# Patient Record
Sex: FEMALE | Race: WHITE | Employment: OTHER | URBAN - METROPOLITAN AREA
[De-identification: names, ages, dates, MRNs, and addresses within clinical notes are randomized per-mention and may not be internally consistent; named-entity substitution may affect disease eponyms.]

---

## 2019-03-25 ENCOUNTER — HOSPITAL ENCOUNTER (OUTPATIENT)
Dept: LAB | Age: 84
Discharge: HOME | End: 2019-03-25
Payer: MEDICARE

## 2019-03-25 ENCOUNTER — OFFICE VISIT (OUTPATIENT)
Dept: SURGERY | Age: 84
End: 2019-03-25

## 2019-03-25 ENCOUNTER — HOSPITAL ENCOUNTER (OUTPATIENT)
Dept: PREADMISSION TESTING | Age: 84
Discharge: HOME | End: 2019-03-25
Payer: MEDICARE

## 2019-03-25 VITALS
SYSTOLIC BLOOD PRESSURE: 178 MMHG | OXYGEN SATURATION: 94 % | HEART RATE: 80 BPM | TEMPERATURE: 97.9 F | HEIGHT: 60 IN | DIASTOLIC BLOOD PRESSURE: 88 MMHG | WEIGHT: 151 LBS | BODY MASS INDEX: 29.64 KG/M2

## 2019-03-25 DIAGNOSIS — K40.90 RIGHT INGUINAL HERNIA: ICD-10-CM

## 2019-03-25 DIAGNOSIS — R10.31 RIGHT GROIN PAIN: ICD-10-CM

## 2019-03-25 DIAGNOSIS — R10.31 RIGHT LOWER QUADRANT ABDOMINAL PAIN: ICD-10-CM

## 2019-03-25 DIAGNOSIS — K40.90 RIGHT INGUINAL HERNIA: Primary | ICD-10-CM

## 2019-03-25 LAB
ANION GAP SERPL CALC-SCNC: 5 MMOL/L (ref 3–18)
BASOPHILS # BLD: 0 K/UL (ref 0–0.1)
BASOPHILS NFR BLD: 0 % (ref 0–2)
BUN SERPL-MCNC: 20 MG/DL (ref 7–18)
BUN/CREAT SERPL: 23 (ref 12–20)
CALCIUM SERPL-MCNC: 8.8 MG/DL (ref 8.5–10.1)
CHLORIDE SERPL-SCNC: 107 MMOL/L (ref 100–108)
CO2 SERPL-SCNC: 30 MMOL/L (ref 21–32)
CREAT SERPL-MCNC: 0.86 MG/DL (ref 0.6–1.3)
DIFFERENTIAL METHOD BLD: ABNORMAL
EOSINOPHIL # BLD: 0.1 K/UL (ref 0–0.4)
EOSINOPHIL NFR BLD: 2 % (ref 0–5)
ERYTHROCYTE [DISTWIDTH] IN BLOOD BY AUTOMATED COUNT: 14.2 % (ref 11.6–14.5)
GLUCOSE SERPL-MCNC: 107 MG/DL (ref 74–99)
HCT VFR BLD AUTO: 43.6 % (ref 35–45)
HGB BLD-MCNC: 14.2 G/DL (ref 12–16)
LYMPHOCYTES # BLD: 1.1 K/UL (ref 0.9–3.6)
LYMPHOCYTES NFR BLD: 16 % (ref 21–52)
MCH RBC QN AUTO: 30.7 PG (ref 24–34)
MCHC RBC AUTO-ENTMCNC: 32.6 G/DL (ref 31–37)
MCV RBC AUTO: 94.4 FL (ref 74–97)
MONOCYTES # BLD: 0.6 K/UL (ref 0.05–1.2)
MONOCYTES NFR BLD: 10 % (ref 3–10)
NEUTS SEG # BLD: 4.8 K/UL (ref 1.8–8)
NEUTS SEG NFR BLD: 72 % (ref 40–73)
PLATELET # BLD AUTO: 250 K/UL (ref 135–420)
PMV BLD AUTO: 9.2 FL (ref 9.2–11.8)
POTASSIUM SERPL-SCNC: 4.3 MMOL/L (ref 3.5–5.5)
RBC # BLD AUTO: 4.62 M/UL (ref 4.2–5.3)
SODIUM SERPL-SCNC: 142 MMOL/L (ref 136–145)
WBC # BLD AUTO: 6.7 K/UL (ref 4.6–13.2)

## 2019-03-25 PROCEDURE — 36415 COLL VENOUS BLD VENIPUNCTURE: CPT

## 2019-03-25 PROCEDURE — 85025 COMPLETE CBC W/AUTO DIFF WBC: CPT

## 2019-03-25 PROCEDURE — 93005 ELECTROCARDIOGRAM TRACING: CPT

## 2019-03-25 PROCEDURE — 80048 BASIC METABOLIC PNL TOTAL CA: CPT

## 2019-03-25 RX ORDER — LOPERAMIDE HCL 2 MG
2 TABLET ORAL
COMMUNITY

## 2019-03-25 NOTE — PATIENT INSTRUCTIONS
If you have any questions or concerns about today's appointment, the verbal and/or written instructions you were given for follow up care, please call our office at [phone]. [facility] [place]  [address] [facility] Deanna Wheeler [address] [place] [address] Road    [phone] office  [phone] fax                             PATIENT PRE AND POST [address]   [address] [address]  [phone]    Before Surgery Instructions:   1) You must have someone available to drive you to and from your procedure and stay with you for the first 24 hours. 2) It is very important that you have nothing to eat or drink after midnight the night before your surgery. This includes chewing gum or sucking on hard candy. Take only heart, blood pressure and cholesterol medications the morning of surgery with only a sip of water. 3) Please stop taking Plavix 10 days prior to your surgery. Stop taking Coumadin 5 days prior to your surgery. Stop taking all Aspirin or Aspirin containing products 7 days prior to your surgery. Stop taking Advil, Motrin, Aleve, and etc. 3 days prior to your surgery. 4) If you take any diabetic medications please consult with your primary care physician on how to take them on the day of your surgery  5) Please stop all Herbal products 2 weeks prior to your surgery. 6) Please arrive at the hospital 2 hours prior to your surgery, unless you have been otherwise instructed. Any required labs/urine drug screen/x-rays will be performed on day of surgery. 7) If you are of child bearing age you will have pregnancy test done the morning of your surgery as soon as you arrive. 8) Patients having an operation on their colon will be given a separate instruction sheet on their Bowel Prep. 9) For any pre-operative work up check in at the main entrance to [facility], and then go to Patient Registration.  These studies are done on a walk in basis they are open from 7:00am to 5:00pm Monday through Friday. 10) Please wash your surgical site the morning of your surgery with antibacterial (i.e. Dial) soap and water. 11) You will be contacted by a hospital preadmission nurse approximately one week prior to scheduled surgery to discuss additional instructions and to review your medications. 12) You may be contacted to change your surgery time. At times this is necessary due to equipment, staffing needs or in the event of a cancellation. Surgery Date and Time:  Tuesday, April 16, 2019 at 10:30am    Please check in at [facility], enter through the Emergency Room entrance and go up to the second floor. Please check in by 8:30am the day of your surgery. After Surgery Instructions: You will need to be seen in the office for a follow-up visit 7-14 days after your surgery. Please call after you have had the procedure to make this appointment. Unless otherwise instructed, you may remove your outer bandage and shower 48 hours after your surgery. If you develop a fever greater than 101, have any significant drainage, bleeding, swelling and/or pus of the wound. Please call our office immediately. You may contact Alton Musa with any questions at [phone].

## 2019-03-25 NOTE — PROGRESS NOTES
General Surgery Consult      Sav Spangler  Admit date: (Not on file)    MRN: [id number]     : 1930     Age: 80 y.o. Attending Physician: Bennett Bullard MD [facility]      History of Present Illness:     Sav Spangler is a 80 y.o. female was referred for evaluation of a symptomatic right inguinal hernia . The patient has stated that she has been diagnosed with Crohn's disease for a decade or more and she is been complaining of recurrent right lower quadrant pain. However recently she has been complaining of right groin pain for which she had a CT scan that showed a right inguinal hernia with small bowel inside the hernia sac with no evidence of obstruction. The patient has stated stated that the pain is dull and it is mainly with movement. She was seen by her primary care physician Dr. Memo Villaseñor who advised her to follow-up with me for surgical evaluation. The patient had a complicated surgical history in the past including a cholecystectomy as well as a laparotomy with fixation of abdominal wall hernia with lysis of adhesion with mesh placement with Dr. Zen Soriano and Dr. Jv Miranda. The patient stated that she is very active and she does a lot of yard work and she does some heavy lifting and she would like the hernia to be fixed.      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    Diaphragmatic hernia 2014    Hypertriglyceridemia 2014    Restless legs syndrome 2014    LBBB (left bundle branch block) 2014    First degree AV block 2014    Vitamin D deficiency 2009     Past Medical History:   Diagnosis Date    Arthritis     Back pain     Crohn's disease ([address]     Dental disorder     GERD (gastroesophageal reflux disease)     Hx of hernia repair     Joint pain     Musculoskeletal disorder     Sinus congestion     Stool color black Past Surgical History:   Procedure Laterality Date    HX CHOLECYSTECTOMY      HX HERNIA REPAIR      HX HYSTERECTOMY  1963    HX MOHS PROCEDURES      left      Social History     Tobacco Use    Smoking status: Never Smoker    Smokeless tobacco: Never Used   Substance Use Topics    Alcohol use: No      Social History     Tobacco Use   Smoking Status Never Smoker   Smokeless Tobacco Never Used     Family History   Problem Relation Age of Onset    Heart Disease Mother     Hypertension Mother     Cancer Father         lung    Cancer Sister     Diabetes Brother     Stroke Brother       Current Outpatient Medications   Medication Sig    loperamide (IMMODIUM) 2 mg tablet Take 2 mg by mouth four (4) times daily as needed for Diarrhea.  mesalamine (DELZICOL PO) Take  by mouth.  Ferrous Sulfate (HIGH POTENCY IRON) 27 mg iron tab Take  by mouth.  plant stanol jaquan (CHOLEST OFF PO) Take  by mouth.  FLAXSEED OIL by Does Not Apply route.  traMADol (ULTRAM) 50 mg tablet Take 1 tablet by mouth every six (6) hours as needed for Pain.  tramadol (ULTRAM) 50 mg tablet Take 50 mg by mouth every six (6) hours as needed for Pain.  mesalamine DR (ASACOL HD) 800 mg DR tablet Take 1,600 mg by mouth daily.  lansoprazole (PREVACID) 15 mg capsule Take  by mouth Daily (before breakfast).  temazepam (RESTORIL) 7.5 mg capsule 7.5 mg.    calcium-vitamin D 600 mg(1,500mg) -200 unit tab     fish oil-omega-3 fatty acids 340-1,000 mg capsule     cyanocobalamin (VITAMIN B-12) 1,000 mcg tablet Take 1,000 mcg by mouth daily.  folic acid (FOLVITE) 1 mg tablet Take  by mouth daily. No current facility-administered medications for this visit.        No Known Allergies     Review of Systems:  Constitutional: negative  Eyes: negative  Ears, Nose, Mouth, Throat, and Face: negative  Respiratory: negative  Cardiovascular: negative  Gastrointestinal: positive for abdominal pain and Right groin pain  Genitourinary:negative  Integument/Breast: negative  Hematologic/Lymphatic: negative  Musculoskeletal:negative  Neurological: negative  Behavioral/Psychiatric: negative  Endocrine: negative  Allergic/Immunologic: negative    Objective:     Visit Vitals  /88 (BP Patient Position: Sitting)   Pulse 80   Temp 97.9 °F (36.6 °C) (Oral)   Ht 5' (1.524 m)   Wt 68.5 kg (151 lb)   SpO2 94%   BMI 29.49 kg/m²       Physical Exam:      General:  in no apparent distress, well developed and well nourished, alert, oriented times 3, afebrile and cooperative   Eyes:  conjunctivae and sclerae normal, pupils equal, round, reactive to light   Throat & Neck: no erythema or exudates noted and neck supple and symmetrical; no palpable masses   Lungs:   clear to auscultation bilaterally   Heart:  Regular rate and rhythm   Abdomen:   flat, soft, nontender, nondistended, no masses or organomegaly. There is a midline scar that is well-healed with no evidence of incisional hernia. There is a right groin tenderness with a small hernia that is tender to palpation but easily reducible.     Extremities: extremities normal, atraumatic, no cyanosis or edema   Skin: Normal.       Imaging and Lab Review:     CBC:   Lab Results   Component Value Date/Time    WBC 7.9 11/11/2014 05:21 PM    RBC 4.83 11/11/2014 05:21 PM    HGB 9.3 (L) 11/23/2014 04:30 AM    HCT 29.0 (L) 11/23/2014 04:30 AM    PLATELET 643 82/88/4620 05:21 PM     BMP:   Lab Results   Component Value Date/Time    Glucose 103 (H) 11/22/2014 04:06 AM    Sodium 139 11/22/2014 04:06 AM    Potassium 4.5 11/22/2014 04:06 AM    Chloride 103 11/22/2014 04:06 AM    CO2 31 11/22/2014 04:06 AM    BUN 15 11/22/2014 04:06 AM    Creatinine 0.98 11/22/2014 04:06 AM    Calcium 8.7 11/22/2014 04:06 AM     CMP:  Lab Results   Component Value Date/Time    Glucose 103 (H) 11/22/2014 04:06 AM    Sodium 139 11/22/2014 04:06 AM    Potassium 4.5 11/22/2014 04:06 AM    Chloride 103 11/22/2014 04:06 AM CO2 31 11/22/2014 04:06 AM    BUN 15 11/22/2014 04:06 AM    Creatinine 0.98 11/22/2014 04:06 AM    Calcium 8.7 11/22/2014 04:06 AM    Anion gap 5 11/22/2014 04:06 AM    BUN/Creatinine ratio 15 11/22/2014 04:06 AM    Alk. phosphatase 57 11/20/2014 05:20 AM    Protein, total 5.5 (L) 11/20/2014 05:20 AM    Albumin 2.8 (L) 11/20/2014 05:20 AM    Globulin 2.7 11/20/2014 05:20 AM    A-G Ratio 1.0 11/20/2014 05:20 AM       No results found for this or any previous visit (from the past 24 hour(s)). images and reports reviewed    Assessment:   Malissa Barron is a 80 y.o. female is presenting with a picture of symptomatic right inguinal hernia. I Discussed the possibility of incarceration, strangulation, enlargement in size over time, and the risk of emergency surgery in the face of strangulation. I also discussed the use of prosthetic materials (mesh), including the risk of infection. Also discussed the risk of surgery including recurrence and the possible need for reoperation and removal of mesh if used, possibility of postoperative small bowel injury, obstruction or ileus, and the risks of general anesthetic. I explained to the the patient about the robotic hernia repair procedure. I discussed with the patient the risk of converting to open especially when reading Dr. Sal Little note that he will spend 1 hour and 1/2 miles of adhesion with her previous laparotomy. I explained that if I am not able to do it robotically then I would have to do it in an open fashion. The patient and her son agreed and they would like to proceed with the surgery . Plan:     Schedule for robotic, possible open repair of right inguinal hernia with placement of mesh . Possible laparotomy .     Please call me if you have any questions (cell phone: [phone])     Signed By: Marci Etienne MD     March 25, 2019

## 2019-03-25 NOTE — PROGRESS NOTES
Ros Pollcok is a 80 y.o. female (: 1930) presenting to address:    No chief complaint on file. Medication list and allergies have been reviewed with Ros Pollock and updated as of today's date. I have gone over all Medical, Surgical and Social History with Ros Pollock and updated/added the information accordingly. Pt presents with CT of ABD with a small sliding hiatal hernia and a right side inguinal hernia containing small intestines. Pt is unsure of when this occurred as she thought the pain was r/t Chrons Disease. 1. Have you been to the ER, urgent care clinic since your last visit? Hospitalized since your last visit? No    2. Have you seen or consulted any other health care providers outside of the [address] [place] [address] since your last visit? Include any pap smears or colon screening.  No

## 2019-03-26 LAB
ATRIAL RATE: 71 BPM
CALCULATED P AXIS, ECG09: 49 DEGREES
CALCULATED R AXIS, ECG10: 8 DEGREES
CALCULATED T AXIS, ECG11: 150 DEGREES
DIAGNOSIS, 93000: NORMAL
P-R INTERVAL, ECG05: 198 MS
Q-T INTERVAL, ECG07: 424 MS
QRS DURATION, ECG06: 138 MS
QTC CALCULATION (BEZET), ECG08: 460 MS
VENTRICULAR RATE, ECG03: 71 BPM

## 2019-04-11 RX ORDER — CEFAZOLIN SODIUM 2 G/50ML
2 SOLUTION INTRAVENOUS
Status: CANCELLED | OUTPATIENT
Start: 2019-04-16 | End: 2019-04-17

## 2019-04-12 RX ORDER — HYDROGEN PEROXIDE 3 %
20 SOLUTION, NON-ORAL MISCELLANEOUS DAILY
COMMUNITY

## 2019-04-12 RX ORDER — TEMAZEPAM 15 MG/1
15 CAPSULE ORAL
COMMUNITY
End: 2022-04-28

## 2019-04-12 RX ORDER — MESALAMINE 400 MG/1
400 CAPSULE, DELAYED RELEASE ORAL 2 TIMES DAILY
COMMUNITY

## 2019-04-12 NOTE — PERIOP NOTES
PAT - SURGICAL PRE-ADMISSION INSTRUCTIONS    NAME:  Katherin Bountiful                                                          TODAY'S DATE:  4/12/2019    SURGERY DATE:  4/16/2019                                  SURGERY ARRIVAL TIME:   0800    1. Do NOT eat or drink anything, including candy or gum, after MIDNIGHT on 04/15 , unless you have specific instructions from your Surgeon or Anesthesia Provider to do so. 2. No smoking on the day of surgery. 3. No alcohol 24 hours prior to the day of surgery. 4. No recreational drugs for one week prior to the day of surgery. 5. Leave all valuables, including money/purse, at home. 6. Remove all jewelry, nail polish, makeup (including mascara); no lotions, powders, deodorant, or perfume/cologne/after shave. 7. Glasses/Contact lenses and Dentures may be worn to the hospital.  They will be removed prior to surgery. 8. Call your doctor if symptoms of a cold or illness develop within 24 ours prior to surgery. 9. AN ADULT MUST DRIVE YOU HOME AFTER OUTPATIENT SURGERY. 10. If you are having an OUTPATIENT procedure, please make arrangements for a responsible adult to be with you for 24 hours after your surgery. 11. If you are admitted to the hospital, you will be assigned to a bed after surgery is complete. Normally a family member will not be able to see you until you are in your assigned bed. 15. Family is encouraged to accompany you to the hospital.  We ask visitors in the treatment area to be limited to ONE person at a time to ensure patient privacy. EXCEPTIONS WILL BE MADE AS NEEDED. 15. Children under 12 are discouraged from entering the treatment area and need to be supervised by an adult when in the waiting room. Special Instructions: Take these medications the morning of surgery with a sip of water:  Coreg    Patient Prep:    shower with anti-bacterial soap    These surgical instructions were reviewed with Mica Parsons during the PAT phone call.      Directions: On the morning of surgery, please go to the [facility]. Enter the building from the [facility] entrance, 1st floor (next to the Emergency Room entrance). Take the elevator to the 2nd floor. Sign in at the Registration Desk.     If you have any questions and/or concerns, please do not hesitate to call:  (Prior to the day of surgery)  [facility] unit:  [phone]  (Day of surgery)  [facility] unit:  [phone]

## 2019-04-15 ENCOUNTER — ANESTHESIA EVENT (OUTPATIENT)
Dept: SURGERY | Age: 84
End: 2019-04-15
Payer: MEDICARE

## 2019-04-16 ENCOUNTER — ANESTHESIA (OUTPATIENT)
Dept: SURGERY | Age: 84
End: 2019-04-16
Payer: MEDICARE

## 2019-04-16 ENCOUNTER — HOSPITAL ENCOUNTER (OUTPATIENT)
Age: 84
Setting detail: OUTPATIENT SURGERY
Discharge: HOME | End: 2019-04-16
Attending: SURGERY | Admitting: SURGERY
Payer: MEDICARE

## 2019-04-16 VITALS
SYSTOLIC BLOOD PRESSURE: 142 MMHG | DIASTOLIC BLOOD PRESSURE: 54 MMHG | TEMPERATURE: 97.4 F | HEART RATE: 56 BPM | WEIGHT: 154.5 LBS | BODY MASS INDEX: 28.43 KG/M2 | HEIGHT: 62 IN | OXYGEN SATURATION: 95 % | RESPIRATION RATE: 18 BRPM

## 2019-04-16 DIAGNOSIS — K43.9 HERNIA OF ABDOMINAL WALL: Primary | ICD-10-CM

## 2019-04-16 PROCEDURE — 76210000024 HC REC RM PH II 2.5 TO 3 HR: Performed by: SURGERY

## 2019-04-16 PROCEDURE — 74011250636 HC RX REV CODE- 250/636: Performed by: NURSE ANESTHETIST, CERTIFIED REGISTERED

## 2019-04-16 PROCEDURE — 74011250636 HC RX REV CODE- 250/636

## 2019-04-16 PROCEDURE — 77030002933 HC SUT MCRYL J&J -A: Performed by: SURGERY

## 2019-04-16 PROCEDURE — 74011250636 HC RX REV CODE- 250/636: Performed by: ANESTHESIOLOGY

## 2019-04-16 PROCEDURE — 76010000933 HC OR TIME 0.5 TO 1HR INTENSV - TIER 2: Performed by: SURGERY

## 2019-04-16 PROCEDURE — C1781 MESH (IMPLANTABLE): HCPCS | Performed by: SURGERY

## 2019-04-16 PROCEDURE — 77030022704 HC SUT VLOC COVD -B: Performed by: SURGERY

## 2019-04-16 PROCEDURE — 74011250637 HC RX REV CODE- 250/637: Performed by: NURSE ANESTHETIST, CERTIFIED REGISTERED

## 2019-04-16 PROCEDURE — 77030008477 HC STYL SATN SLP COVD -A: Performed by: ANESTHESIOLOGY

## 2019-04-16 PROCEDURE — 74011250636 HC RX REV CODE- 250/636: Performed by: SURGERY

## 2019-04-16 PROCEDURE — 77030031139 HC SUT VCRL2 J&J -A: Performed by: SURGERY

## 2019-04-16 PROCEDURE — 77030035048 HC TRCR ENDOSC OPTCL COVD -B: Performed by: SURGERY

## 2019-04-16 PROCEDURE — 74011000250 HC RX REV CODE- 250: Performed by: SURGERY

## 2019-04-16 PROCEDURE — 76210000006 HC OR PH I REC 0.5 TO 1 HR: Performed by: SURGERY

## 2019-04-16 PROCEDURE — 77030020703 HC SEAL CANN DISP INTU -B: Performed by: SURGERY

## 2019-04-16 PROCEDURE — 77030018842 HC SOL IRR SOD CL 9% BAXT -A: Performed by: SURGERY

## 2019-04-16 PROCEDURE — 76060000032 HC ANESTHESIA 0.5 TO 1 HR: Performed by: SURGERY

## 2019-04-16 PROCEDURE — 74011000250 HC RX REV CODE- 250

## 2019-04-16 PROCEDURE — 77030008683 HC TU ET CUF COVD -A: Performed by: ANESTHESIOLOGY

## 2019-04-16 PROCEDURE — 77030010507 HC ADH SKN DERMBND J&J -B: Performed by: SURGERY

## 2019-04-16 PROCEDURE — 77030032490 HC SLV COMPR SCD KNE COVD -B: Performed by: SURGERY

## 2019-04-16 PROCEDURE — 77030035277 HC OBTRTR BLDELSS DISP INTU -B: Performed by: SURGERY

## 2019-04-16 DEVICE — LAPAROSCOPIC SELF-FIXATING MESH POLYESTER WITH POLYLACTIC ACID GRIPS AND COLLAGEN FILM
Type: IMPLANTABLE DEVICE | Site: INGUINAL | Status: FUNCTIONAL
Brand: PROGRIP

## 2019-04-16 RX ORDER — ONDANSETRON 2 MG/ML
4 INJECTION INTRAMUSCULAR; INTRAVENOUS ONCE
Status: COMPLETED | OUTPATIENT
Start: 2019-04-16 | End: 2019-04-16

## 2019-04-16 RX ORDER — DIPHENHYDRAMINE HYDROCHLORIDE 50 MG/ML
25 INJECTION, SOLUTION INTRAMUSCULAR; INTRAVENOUS
Status: DISCONTINUED | OUTPATIENT
Start: 2019-04-16 | End: 2019-04-16 | Stop reason: HOSPADM

## 2019-04-16 RX ORDER — PROMETHAZINE HYDROCHLORIDE 25 MG/ML
6.25 INJECTION, SOLUTION INTRAMUSCULAR; INTRAVENOUS ONCE
Status: COMPLETED | OUTPATIENT
Start: 2019-04-16 | End: 2019-04-16

## 2019-04-16 RX ORDER — BUPIVACAINE HYDROCHLORIDE AND EPINEPHRINE 5; 5 MG/ML; UG/ML
INJECTION, SOLUTION EPIDURAL; INTRACAUDAL; PERINEURAL AS NEEDED
Status: DISCONTINUED | OUTPATIENT
Start: 2019-04-16 | End: 2019-04-16 | Stop reason: HOSPADM

## 2019-04-16 RX ORDER — FAMOTIDINE 20 MG/1
20 TABLET, FILM COATED ORAL ONCE
Status: COMPLETED | OUTPATIENT
Start: 2019-04-16 | End: 2019-04-16

## 2019-04-16 RX ORDER — OXYCODONE AND ACETAMINOPHEN 5; 325 MG/1; MG/1
1 TABLET ORAL
Qty: 24 TAB | Refills: 0 | Status: SHIPPED | OUTPATIENT
Start: 2019-04-16 | End: 2019-04-19

## 2019-04-16 RX ORDER — GLYCOPYRROLATE 0.2 MG/ML
INJECTION INTRAMUSCULAR; INTRAVENOUS AS NEEDED
Status: DISCONTINUED | OUTPATIENT
Start: 2019-04-16 | End: 2019-04-16 | Stop reason: HOSPADM

## 2019-04-16 RX ORDER — SODIUM CHLORIDE, SODIUM LACTATE, POTASSIUM CHLORIDE, CALCIUM CHLORIDE 600; 310; 30; 20 MG/100ML; MG/100ML; MG/100ML; MG/100ML
50 INJECTION, SOLUTION INTRAVENOUS CONTINUOUS
Status: DISCONTINUED | OUTPATIENT
Start: 2019-04-16 | End: 2019-04-16 | Stop reason: HOSPADM

## 2019-04-16 RX ORDER — VECURONIUM BROMIDE FOR INJECTION 1 MG/ML
INJECTION, POWDER, LYOPHILIZED, FOR SOLUTION INTRAVENOUS AS NEEDED
Status: DISCONTINUED | OUTPATIENT
Start: 2019-04-16 | End: 2019-04-16 | Stop reason: HOSPADM

## 2019-04-16 RX ORDER — NEOSTIGMINE METHYLSULFATE 5 MG/5 ML
SYRINGE (ML) INTRAVENOUS AS NEEDED
Status: DISCONTINUED | OUTPATIENT
Start: 2019-04-16 | End: 2019-04-16 | Stop reason: HOSPADM

## 2019-04-16 RX ORDER — FENTANYL CITRATE 50 UG/ML
25 INJECTION, SOLUTION INTRAMUSCULAR; INTRAVENOUS
Status: DISCONTINUED | OUTPATIENT
Start: 2019-04-16 | End: 2019-04-16 | Stop reason: HOSPADM

## 2019-04-16 RX ORDER — DEXAMETHASONE SODIUM PHOSPHATE 4 MG/ML
INJECTION, SOLUTION INTRA-ARTICULAR; INTRALESIONAL; INTRAMUSCULAR; INTRAVENOUS; SOFT TISSUE AS NEEDED
Status: DISCONTINUED | OUTPATIENT
Start: 2019-04-16 | End: 2019-04-16 | Stop reason: HOSPADM

## 2019-04-16 RX ORDER — CEFAZOLIN SODIUM 2 G/50ML
2 SOLUTION INTRAVENOUS
Status: COMPLETED | OUTPATIENT
Start: 2019-04-16 | End: 2019-04-16

## 2019-04-16 RX ORDER — FENTANYL CITRATE 50 UG/ML
INJECTION, SOLUTION INTRAMUSCULAR; INTRAVENOUS AS NEEDED
Status: DISCONTINUED | OUTPATIENT
Start: 2019-04-16 | End: 2019-04-16 | Stop reason: HOSPADM

## 2019-04-16 RX ORDER — OXYCODONE AND ACETAMINOPHEN 5; 325 MG/1; MG/1
1 TABLET ORAL AS NEEDED
Status: COMPLETED | OUTPATIENT
Start: 2019-04-16 | End: 2019-04-16

## 2019-04-16 RX ORDER — MIDAZOLAM HYDROCHLORIDE 1 MG/ML
INJECTION, SOLUTION INTRAMUSCULAR; INTRAVENOUS AS NEEDED
Status: DISCONTINUED | OUTPATIENT
Start: 2019-04-16 | End: 2019-04-16 | Stop reason: HOSPADM

## 2019-04-16 RX ORDER — ONDANSETRON 2 MG/ML
INJECTION INTRAMUSCULAR; INTRAVENOUS AS NEEDED
Status: DISCONTINUED | OUTPATIENT
Start: 2019-04-16 | End: 2019-04-16 | Stop reason: HOSPADM

## 2019-04-16 RX ORDER — HYDROMORPHONE HYDROCHLORIDE 2 MG/ML
0.5 INJECTION, SOLUTION INTRAMUSCULAR; INTRAVENOUS; SUBCUTANEOUS
Status: DISCONTINUED | OUTPATIENT
Start: 2019-04-16 | End: 2019-04-16 | Stop reason: HOSPADM

## 2019-04-16 RX ORDER — SODIUM CHLORIDE, SODIUM LACTATE, POTASSIUM CHLORIDE, CALCIUM CHLORIDE 600; 310; 30; 20 MG/100ML; MG/100ML; MG/100ML; MG/100ML
100 INJECTION, SOLUTION INTRAVENOUS CONTINUOUS
Status: DISCONTINUED | OUTPATIENT
Start: 2019-04-16 | End: 2019-04-16 | Stop reason: HOSPADM

## 2019-04-16 RX ORDER — LIDOCAINE HYDROCHLORIDE 20 MG/ML
INJECTION, SOLUTION EPIDURAL; INFILTRATION; INTRACAUDAL; PERINEURAL AS NEEDED
Status: DISCONTINUED | OUTPATIENT
Start: 2019-04-16 | End: 2019-04-16 | Stop reason: HOSPADM

## 2019-04-16 RX ORDER — PROPOFOL 10 MG/ML
INJECTION, EMULSION INTRAVENOUS AS NEEDED
Status: DISCONTINUED | OUTPATIENT
Start: 2019-04-16 | End: 2019-04-16 | Stop reason: HOSPADM

## 2019-04-16 RX ORDER — SUCCINYLCHOLINE CHLORIDE 20 MG/ML
INJECTION INTRAMUSCULAR; INTRAVENOUS AS NEEDED
Status: DISCONTINUED | OUTPATIENT
Start: 2019-04-16 | End: 2019-04-16 | Stop reason: HOSPADM

## 2019-04-16 RX ORDER — ONDANSETRON 2 MG/ML
4 INJECTION INTRAMUSCULAR; INTRAVENOUS
Status: DISCONTINUED | OUTPATIENT
Start: 2019-04-16 | End: 2019-04-16 | Stop reason: HOSPADM

## 2019-04-16 RX ADMIN — SODIUM CHLORIDE, SODIUM LACTATE, POTASSIUM CHLORIDE, AND CALCIUM CHLORIDE 100 ML/HR: 600; 310; 30; 20 INJECTION, SOLUTION INTRAVENOUS at 09:11

## 2019-04-16 RX ADMIN — SUCCINYLCHOLINE CHLORIDE 100 MG: 20 INJECTION INTRAMUSCULAR; INTRAVENOUS at 09:50

## 2019-04-16 RX ADMIN — VECURONIUM BROMIDE FOR INJECTION 3 MG: 1 INJECTION, POWDER, LYOPHILIZED, FOR SOLUTION INTRAVENOUS at 09:56

## 2019-04-16 RX ADMIN — GLYCOPYRROLATE 0.4 MG: 0.2 INJECTION INTRAMUSCULAR; INTRAVENOUS at 10:27

## 2019-04-16 RX ADMIN — HYDROMORPHONE HYDROCHLORIDE 0.5 MG: 2 INJECTION INTRAMUSCULAR; INTRAVENOUS; SUBCUTANEOUS at 11:06

## 2019-04-16 RX ADMIN — PROPOFOL 100 MG: 10 INJECTION, EMULSION INTRAVENOUS at 09:50

## 2019-04-16 RX ADMIN — PROMETHAZINE HYDROCHLORIDE 6.25 MG: 25 INJECTION INTRAMUSCULAR; INTRAVENOUS at 13:35

## 2019-04-16 RX ADMIN — FENTANYL CITRATE 25 MCG: 50 INJECTION, SOLUTION INTRAMUSCULAR; INTRAVENOUS at 11:16

## 2019-04-16 RX ADMIN — LIDOCAINE HYDROCHLORIDE 40 MG: 20 INJECTION, SOLUTION EPIDURAL; INFILTRATION; INTRACAUDAL; PERINEURAL at 09:50

## 2019-04-16 RX ADMIN — ONDANSETRON 4 MG: 2 INJECTION INTRAMUSCULAR; INTRAVENOUS at 12:50

## 2019-04-16 RX ADMIN — FAMOTIDINE 20 MG: 20 TABLET ORAL at 09:00

## 2019-04-16 RX ADMIN — SODIUM CHLORIDE, SODIUM LACTATE, POTASSIUM CHLORIDE, AND CALCIUM CHLORIDE 50 ML/HR: 600; 310; 30; 20 INJECTION, SOLUTION INTRAVENOUS at 12:55

## 2019-04-16 RX ADMIN — HYDROMORPHONE HYDROCHLORIDE 0.5 MG: 2 INJECTION INTRAMUSCULAR; INTRAVENOUS; SUBCUTANEOUS at 10:56

## 2019-04-16 RX ADMIN — FENTANYL CITRATE 100 MCG: 50 INJECTION, SOLUTION INTRAMUSCULAR; INTRAVENOUS at 09:50

## 2019-04-16 RX ADMIN — OXYCODONE HYDROCHLORIDE AND ACETAMINOPHEN 1 TABLET: 5; 325 TABLET ORAL at 13:54

## 2019-04-16 RX ADMIN — MIDAZOLAM HYDROCHLORIDE 2 MG: 1 INJECTION, SOLUTION INTRAMUSCULAR; INTRAVENOUS at 09:47

## 2019-04-16 RX ADMIN — ONDANSETRON 4 MG: 2 INJECTION INTRAMUSCULAR; INTRAVENOUS at 10:23

## 2019-04-16 RX ADMIN — DEXAMETHASONE SODIUM PHOSPHATE 4 MG: 4 INJECTION, SOLUTION INTRA-ARTICULAR; INTRALESIONAL; INTRAMUSCULAR; INTRAVENOUS; SOFT TISSUE at 09:50

## 2019-04-16 RX ADMIN — CEFAZOLIN SODIUM 2 G: 2 SOLUTION INTRAVENOUS at 09:54

## 2019-04-16 RX ADMIN — Medication 3 MG: at 10:27

## 2019-04-16 NOTE — BRIEF OP NOTE
BRIEF OPERATIVE NOTE    Date of Procedure: 4/16/2019   Preoperative Diagnosis: (R) inguinql Hernia K40.90, adhesions  Postoperative Diagnosis: (R) inguinql Hernia K40.90, adhesions    Procedure(s):  Robotic Repair of inquinal hernia, lysis of adhesions- Mesh placement  Surgeon(s) and Role:     * David Contreras MD - Primary  Surgical Staff:  Circ-1: Jayce Caballero RN  Scrub Tech-1: Christy Phoenix  Surg Asst-1: Bradley MANN  Event Time In Time Out   Incision Start 1000    Incision Close 1031      Anesthesia: General   Estimated Blood Loss: Minimal.  Specimens: * No specimens in log *   Findings: Large right indirect inguinal hernia. Complications: None. Implants:   Implant Name Type Inv.  Item Serial No.  Lot No. LRB No. Used Action   MESH ABSORB SURG PROGRIP 10X15 -- PROGRIP - [id number]  MESH ABSORB SURG PROGRIP 10X15 -- PROGRIP  [employer]  SURGICAL [id number] Right 1 Implanted

## 2019-04-16 NOTE — PERIOP NOTES
Pre-Op Summary    Pt arrived via car with family/friend and is oriented to time, place, person and situation. Patient with steady gait with none assistive devices. Visit Vitals  /69 (BP 1 Location: Left arm, BP Patient Position: At rest)   Pulse 71   Temp 98.1 °F (36.7 °C)   Resp 15   Ht 5' 1.5\" (1.562 m)   Wt 70.1 kg (154 lb 8 oz)   SpO2 97%   BMI 28.72 kg/m²       Peripheral IV located on Right antecubital .    Patients belongings are located [facility]. Patient's point of contact is Lovenia Noss (son) and their contact number is: [phone]. They will be in the waiting room. They are able to receive medication information. They will be their ride home.

## 2019-04-16 NOTE — DISCHARGE INSTRUCTIONS
Discharge Instructions Following Surgery    Patient: Tomas Castillo MRN: [id number]  SSN: [id number]    YOB: 1930  Age: 80 y.o. Sex: female      Activity  · As tolerated, walking encourage, stairs are okay. · Avoid strenuous activities - no lifting anything heavier than 15 pounds till seen in the clinic. · You may shower at home after 48 hours. Diet  · Regular diet after nausea from the anesthetic has passed. Pain  · Take pain medication as directed by your doctor. Please add Aleve or Ibuprofen as needed (If no contraindications for these types of medications). ·  Call your doctor if pain is NOT relieved by medication. Wound and Dressing Care  · There is glue on the wounds. No need for any dressing care. · Apply ice packs to the area of the surgery (like in inguinal hernia apply to the groin not the incisions) for the first 1 to 2 days  · Apply warm compresses after 2 days for pain relieve if needed    After Anesthesia  · For the first 24 hours: DO NOT Drive, Drink alcoholic beverages, or Make important decisions. · Be aware of dizziness following anesthesia and while taking pain medication. Call your doctor if  · Excessive bleeding that does not stop after holding mild pressure over the area. · Temperature of 101 degrees F or above. · Redness,excessive swelling or bruising, and/or green or yellow, smelly discharge from incision. · If nausea and vomiting continues. Appointment date/time Follow-Up Phone Calls    · Call the office at [phone] to make your follow-up appointment in 2 weeks after the surgery (if not already set up) . Dr. Gilda Rebolledo cell phone number is [phone]. Please call me if you have any concerns or questions.          DISCHARGE SUMMARY from Nurse    PATIENT INSTRUCTIONS:    After general anesthesia or intravenous sedation, for 24 hours or while taking prescription Narcotics:  · Limit your activities  · Do not drive and operate hazardous machinery  · Do not make important personal or business decisions  · Do  not drink alcoholic beverages  · If you have not urinated within 8 hours after discharge, please contact your surgeon on call. Report the following to your surgeon:  · Excessive pain, swelling, redness or odor of or around the surgical area  · Temperature over 100.5  · Nausea and vomiting lasting longer than 4 hours or if unable to take medications  · Any signs of decreased circulation or nerve impairment to extremity: change in color, persistent  numbness, tingling, coldness or increase pain  · Any questions    What to do at Home:  Recommended activity: Activity as tolerated and no driving for today    These are general instructions for a healthy lifestyle:    No smoking/ No tobacco products/ Avoid exposure to second hand smoke  Surgeon General's Warning:  Quitting smoking now greatly reduces serious risk to your health. Obesity, smoking, and sedentary lifestyle greatly increases your risk for illness    A healthy diet, regular physical exercise & weight monitoring are important for maintaining a healthy lifestyle    You may be retaining fluid if you have a history of heart failure or if you experience any of the following symptoms:  Weight gain of 3 pounds or more overnight or 5 pounds in a week, increased swelling in our hands or feet or shortness of breath while lying flat in bed. Please call your doctor as soon as you notice any of these symptoms; do not wait until your next office visit. Recognize signs and symptoms of STROKE:    F-face looks uneven    A-arms unable to move or move unevenly    S-speech slurred or non-existent    T-time-call 911 as soon as signs and symptoms begin-DO NOT go       Back to bed or wait to see if you get better-TIME IS BRAIN. Warning Signs of HEART ATTACK     Call 911 if you have these symptoms:   Chest discomfort.  Most heart attacks involve discomfort in the center of the chest that lasts more than a few minutes, or that goes away and comes back. It can feel like uncomfortable pressure, squeezing, fullness, or pain.  Discomfort in other areas of the upper body. Symptoms can include pain or discomfort in one or both arms, the back, neck, jaw, or stomach.  Shortness of breath with or without chest discomfort.  Other signs may include breaking out in a cold sweat, nausea, or lightheadedness. Don't wait more than five minutes to call 911 - MINUTES MATTER! Fast action can save your life. Calling 911 is almost always the fastest way to get lifesaving treatment. Emergency Medical Services staff can begin treatment when they arrive -- up to an hour sooner than if someone gets to the hospital by car. The discharge information has been reviewed with the patient. The patient verbalized understanding. Discharge medications reviewed with the patient and appropriate educational materials and side effects teaching were provided. Patient armband removed and given to patient to take home. Patient was informed of the privacy risks if armband lost or stolen.

## 2019-04-16 NOTE — ANESTHESIA PREPROCEDURE EVALUATION
Relevant Problems   No relevant active problems       Anesthetic History   No history of anesthetic complications            Review of Systems / Medical History  Patient summary reviewed and pertinent labs reviewed    Pulmonary  Within defined limits                 Neuro/Psych   Within defined limits           Cardiovascular                  Exercise tolerance: >4 METS     GI/Hepatic/Renal     GERD: well controlled          Comments: Crohn's disease Endo/Other        Arthritis     Other Findings              Physical Exam    Airway  Mallampati: II  TM Distance: 4 - 6 cm  Neck ROM: normal range of motion   Mouth opening: Normal     Cardiovascular  Regular rate and rhythm,  S1 and S2 normal,  no murmur, click, rub, or gallop             Dental    Dentition: Full lower dentures and Full upper dentures     Pulmonary  Breath sounds clear to auscultation               Abdominal         Other Findings            Anesthetic Plan    ASA: 3  Anesthesia type: general          Induction: Intravenous  Anesthetic plan and risks discussed with: Patient

## 2019-04-16 NOTE — PERIOP NOTES
Care taken over from Maximiliano at 1215. Pt tolerating fluids and crackers. Discharge instructions reviewed with pt and son. 1237 pt began vomiting, second episode. [address]. Danie Ott to inform her. Per Dr. Danie Ott she will order another dose of Zofran. 1250 4mg Zofran administered. 1259 pt vomited, emesis clear and yellow. 1320 pt vomited another small amount of clear yellow emesis. 1330 BRISA. William Gerardo resumed care of pt.

## 2019-04-16 NOTE — ANESTHESIA POSTPROCEDURE EVALUATION
Procedure(s):  Robotic Repair of inquinal hernia, lysis of adhesions- Mesh placement. general    Anesthesia Post Evaluation      Multimodal analgesia: multimodal analgesia used between 6 hours prior to anesthesia start to PACU discharge  Patient location during evaluation: bedside  Patient participation: complete - patient participated  Level of consciousness: awake  Pain score: 3  Pain management: adequate  Airway patency: patent  Anesthetic complications: no  Cardiovascular status: stable  Respiratory status: acceptable  Hydration status: acceptable  Post anesthesia nausea and vomiting:  controlled      Vitals Value Taken Time   /52 4/16/2019 11:28 AM   Temp 36.3 °C (97.4 °F) 4/16/2019 10:46 AM   Pulse 55 4/16/2019 11:35 AM   Resp 12 4/16/2019 11:35 AM   SpO2 93 % 4/16/2019 11:35 AM   Vitals shown include unvalidated device data.

## 2019-04-16 NOTE — OP NOTES
[facility]  OPERATIVE REPORT    Name:  Alesha Encarnacion  MR#:   [id number]  :  1930  ACCOUNT #:  [de-identified]  DATE OF SERVICE:  2019    PREOPERATIVE DIAGNOSIS:  Right inguinal hernia. POSTOPERATIVE DIAGNOSIS:  Right indirect inguinal hernia. PROCEDURE PERFORMED:  Robotic repair of right indirect inguinal hernia with placement of mesh. SURGEON:  Parminder Sauer MD    ASSISTANT:  Eufemia Yu MD    ANESTHESIA:  General.    COMPLICATIONS:  None. SPECIMENS REMOVED:  None. IMPLANTS: None. ESTIMATED BLOOD LOSS:  Minimal.    PROCEDURE:  The patient was brought to operating room. Anesthesia was induced. Scrubbing and draping of the abdomen was done in usual manner. A time-out was performed. A skin incision in the left upper quadrant was performed first.  Veress needle was inserted. Saline drop test was performed. Abdomen was insufflated. I decided to go with the Optiview with a 5-mm scope because of her previous laparotomy. We were able to go under direct visualization on the left upper quadrant. There was relatively no adhesion except in the upper midline which did not affect us. Another 8 mm port was placed in the supraumbilical area under direct visualization, another one on the right side of the abdominal wall. At this point, inspection of the abdomen revealed a large right indirect inguinal hernia, so the patient was placed in the slightly Trendelenburg position and the robot was docked. The peritoneum was opened on the right groin. The preperitoneal space was dissected. There was a very large hernia sac that was completely reduced. The inferior epigastric vessels were identified and protected. The round ligament was identified and divided.   At this point, a 15 x 10 cm ProGrip Covidien mesh was placed in the preperitoneal space and it was fixed with interrupted 2-0 Vicryl sutures and then the peritoneum was closed on top of the mesh with a 2-0 V-Loc suture. Hemostasis was secured, instruments were removed, the robot was undocked, and the skin incisions were closed with 4-0 Monocryl and glue.       MD JUAREZ [email or web address]/  D:  04/16/2019 10:40  T:  04/16/2019 13:57  JOB #:  [id number]

## 2019-04-16 NOTE — PROGRESS NOTES
Date of Surgery Update:  Niesha Mercado was seen and examined. History and physical has been reviewed. The patient has been examined. There have been no significant clinical changes since the completion of the originally dated History and Physical. Will proceed with robotic right, possible open repair of right inguinal hernia with mesh.      Signed By: Philippe Ludwig MD     April 16, 2019 9:32 AM

## 2019-04-16 NOTE — PERIOP NOTES
Phase 2 Recovery Summary  Patient arrived to Phase 2 at 1139  Report received from [facility]  Vitals:    04/16/19 1048 04/16/19 1058 04/16/19 1128 04/16/19 1140   BP: 167/63 170/58 155/52 142/54   Pulse: 62 (!) 57 (!) 54 (!) 56   Resp: 17 17 19 18   Temp:       SpO2: 98% 99% 94% 95%   Weight:       Height:           oriented to time, place, person and situation    Lines and Drains  Peripheral Intravenous Line:   Peripheral IV 04/16/19 Right Antecubital (Active)   Site Assessment Clean, dry, & intact 4/16/2019 11:42 AM   Phlebitis Assessment 0 4/16/2019 11:42 AM   Infiltration Assessment 0 4/16/2019 11:42 AM   Dressing Status Clean, dry, & intact 4/16/2019 11:42 AM   Dressing Type Tape;Transparent 4/16/2019 11:42 AM   Hub Color/Line Status Pink; Infusing 4/16/2019 11:42 AM       Wound  Wound Abdomen (Active)   Number of days: 1609       Wound Abdomen Anterior trocar sites x 3 (Active)   Dressing Status Clean, dry, and intact;Dry; Intact; Old drainage 4/16/2019 11:30 AM   Dressing Type Topical skin adhesive/glue 4/16/2019 11:30 AM   Incision Site Well Approximated Yes 4/16/2019 10:30 AM   Number of days: 0       Wound Abdomen (Active)   Number of days: 0      Patient arrived to phase 2 from PACU. Alert and oriented x4. No complaints of pain, nausea or dizziness. Vital signs taken. Patient ambulated from bed to chair with minimal assistance.  brought back to recovery room. Given ginger ale to sip on and crackers. 1250-Report given to CORRINE Stark and CORRINE Carrasquillo.    1118- Returned to patient. Report stated patient had been vomiting. Received zofran IV. Has not helped nausea. Spoke with Dr. Caroline Arenas, ordered phenergan IM. 1335- Gave phenergan in left deltoid. Monitoring patient. 1350- Patient stated nausea is subsiding but beginning to have pain at incision site. Percocet on chart. Patient taking down applesauce before taking pain pill. IV removed and patient allowed to get dressed.  Reviewed discharge instructions.  signed for discharge. Patient transported to vehicle via wheelchair.        Patient discharged to home with son, Raul Galvez  at [place] [address]

## 2019-05-01 ENCOUNTER — OFFICE VISIT (OUTPATIENT)
Dept: SURGERY | Age: 84
End: 2019-05-01

## 2019-05-01 VITALS
HEART RATE: 69 BPM | HEIGHT: 62 IN | SYSTOLIC BLOOD PRESSURE: 161 MMHG | TEMPERATURE: 97.1 F | DIASTOLIC BLOOD PRESSURE: 72 MMHG | WEIGHT: 152 LBS | OXYGEN SATURATION: 95 % | BODY MASS INDEX: 27.97 KG/M2

## 2019-05-01 DIAGNOSIS — K43.9 HERNIA OF ABDOMINAL WALL: Primary | ICD-10-CM

## 2019-05-01 NOTE — PATIENT INSTRUCTIONS
If you have any questions or concerns about today's appointment, the verbal and/or written instructions you were given for follow up care, please call our office at [phone].     [facility] [place]  [address] [place], [place] [address]ide [address]    [phone] office  [phone]

## 2019-05-01 NOTE — PROGRESS NOTES
Cathy Smalls is a 80 y.o. female (: 1930) presenting to address:    Chief Complaint   Patient presents with    Surgical Follow-up     right side indirect inguinal hernia        Medication list and allergies have been reviewed with Cathy Smalls and updated as of today's date. I have gone over all Medical, Surgical and Social History with Cathy Smalls and updated/added the information accordingly. 1. Have you been to the ER, urgent care clinic since your last visit? Hospitalized since your last visit? No    2. Have you seen or consulted any other health care providers outside of the [address] [place] [address] since your last visit? Include any pap smears or colon screening. No       Pt reports that's she is feeling well and states \"ready to get back to my regular routine. \" She denies and N&V, constipations, diarrhea, body ache, fever, or chills.              Visit Vitals  /72 (BP Patient Position: Sitting)   Pulse 69   Temp 97.1 °F (36.2 °C) (Oral)   Ht 5' 1.5\" (1.562 m)   Wt 68.9 kg (152 lb)   SpO2 95%   BMI 28.26 kg/m²

## 2019-05-01 NOTE — PROGRESS NOTES
Patient seen and examined. She is doing well. Tolerating regular diet. Her abdomen is soft and non tender. Her wounds are healing well. Follow-up as needed.

## 2019-05-17 ENCOUNTER — TELEPHONE (OUTPATIENT)
Dept: SURGERY | Age: 84
End: 2019-05-17

## 2019-05-17 NOTE — TELEPHONE ENCOUNTER
Patient called to inform the clinic that she has been experiencing a burning and pinching pain at the site where she had her hernia defect. She informs me that she she has been taking acetaminophen for pain control, but expresses concern that she has have injured herself. She reports that she \"thinks\" there is a lump there, but denies hear a pop sound or a pulling sensation. She denies any N&V, diarrhea, constipation, or blood in her stools. She was added to the schedule Monday at 930 AM. I instructed her to start ibuprofen therapy 400 mg q8h and alternate cold and heat therapies every 20 minutes. I also asked the patient to call me at 4 PM today so I can assess the efficacy of the therapies. I called and informed Dr. Marii Rubalcava of the status change. Patient verbalized understanding and the call was ended.

## 2019-05-17 NOTE — TELEPHONE ENCOUNTER
Patient called to inform clinic \"I did exactly what you said to do and Im feeling better\", \"Ithink this weekend Im going to cool it\". I emphasized that she can rest and continue her therapies, but if at any point the therapies cause discomfort or pain to d/c them immediatly and that we would see her Monday. Pt verbalized understand and no further questions was verified. Call was ended.

## 2019-05-20 ENCOUNTER — OFFICE VISIT (OUTPATIENT)
Dept: SURGERY | Age: 84
End: 2019-05-20

## 2019-05-20 VITALS
TEMPERATURE: 97.7 F | BODY MASS INDEX: 28.16 KG/M2 | HEIGHT: 62 IN | HEART RATE: 68 BPM | SYSTOLIC BLOOD PRESSURE: 159 MMHG | WEIGHT: 153 LBS | OXYGEN SATURATION: 96 % | DIASTOLIC BLOOD PRESSURE: 72 MMHG

## 2019-05-20 DIAGNOSIS — K40.90 RIGHT INGUINAL HERNIA: Primary | ICD-10-CM

## 2019-05-20 NOTE — PATIENT INSTRUCTIONS
If you have and questions or concerns about today's appointment, the verbal and/or written instructions you were given for follow up care, please call our office at [phone].      [facility] - [address]  Office: [phone]   Fax: [phone]

## 2019-05-20 NOTE — PROGRESS NOTES
Carole Lerma is a 80 y.o. female (: 1930) presenting to address:    Chief Complaint   Patient presents with    Abdominal Pain     at hernia repair site        Medication list and allergies have been reviewed with Carole Lerma and updated as of today's date. I have gone over all Medical, Surgical and Social History with Carole Lerma and updated/added the information accordingly. 1. Have you been to the ER, urgent care clinic since your last visit? Hospitalized since your last visit? No    2. Have you seen or consulted any other health care providers outside of the [address] [place] [address] since your last visit? Include any pap smears or colon screening. No     Patient reports to clinic for follow up after she reported that she is experiencing pain at the hernia site with activity specifically with walking, however she does report that the pain has been reduced with therapies and interventions taught to her Friday. She also reports that her sxs resolve with rest and sitting. She does report to me that she has a lump at her repair site and that she is experiencing  Pulling sensation and burning. Patient denies any N&V, constipation, diarrhea, fever, body ache, or chills.        Visit Vitals  /72 (BP Patient Position: Sitting)   Pulse 68   Temp 97.7 °F (36.5 °C) (Oral)   Ht 5' 1.5\" (1.562 m)   Wt 69.4 kg (153 lb)   SpO2 96%   BMI 28.44 kg/m²

## 2019-05-20 NOTE — PROGRESS NOTES
Patient seen and examined. She is doing relatively well however she stated that she did a lot of heavy lifting and yard work and she feels some tingling and numbness and numbing numbness sensation in the right groin. She denies any bulge or pain. She is tolerating regular diet and having normal bowel movement. On exam her abdomen is soft and non-tender. Her trocar site wounds are well-healed. Her right groin exam showed no evidence of recurrence of the hernia. I reassured the patient that there is no evidence of recurrence of the hernia and this could be from irritation to the nerve and will give it some time. I asked her to follow-up with me in 2 months if she still have the same symptoms .

## 2019-09-13 ENCOUNTER — HOSPITAL ENCOUNTER (OUTPATIENT)
Dept: NON INVASIVE DIAGNOSTICS | Age: 84
Discharge: HOME | End: 2019-09-13
Payer: MEDICARE

## 2019-09-13 VITALS
SYSTOLIC BLOOD PRESSURE: 159 MMHG | WEIGHT: 153 LBS | DIASTOLIC BLOOD PRESSURE: 72 MMHG | HEIGHT: 62 IN | BODY MASS INDEX: 28.16 KG/M2

## 2019-09-13 DIAGNOSIS — R42 DIZZINESS: ICD-10-CM

## 2019-09-13 LAB
ECHO AO ARCH DIAM: 3 CM
ECHO AO ASC DIAM: 3.54 CM
ECHO AO ROOT DIAM: 3.58 CM
ECHO AV AREA PEAK VELOCITY: 1.7 CM2
ECHO AV AREA VTI: 1.6 CM2
ECHO AV AREA/BSA PEAK VELOCITY: 1 CM2/M2
ECHO AV AREA/BSA VTI: 0.9 CM2/M2
ECHO AV MEAN GRADIENT: 26.5 MMHG
ECHO AV PEAK GRADIENT: 40.1 MMHG
ECHO AV PEAK VELOCITY: 316.78 CM/S
ECHO AV VTI: 74.24 CM
ECHO IVC SNIFF: 1.9 CM
ECHO LA MAJOR AXIS: 3 CM
ECHO LA TO AORTIC ROOT RATIO: 0.84
ECHO LV INTERNAL DIMENSION DIASTOLIC: 4.16 CM (ref 3.9–5.3)
ECHO LV INTERNAL DIMENSION SYSTOLIC: 3.01 CM
ECHO LV IVSD: 1.25 CM (ref 0.6–0.9)
ECHO LV MASS 2D: 158.2 G (ref 67–162)
ECHO LV MASS INDEX 2D: 93.3 G/M2 (ref 43–95)
ECHO LV POSTERIOR WALL DIASTOLIC: 0.78 CM (ref 0.6–0.9)
ECHO LVOT CARDIAC OUTPUT: 23.6 L/MIN
ECHO LVOT DIAM: 2.14 CM
ECHO LVOT PEAK GRADIENT: 8.5 MMHG
ECHO LVOT PEAK VELOCITY: 145.39 CM/S
ECHO LVOT SV: 119.1 ML
ECHO LVOT VTI: 32.96 CM
ECHO MV A VELOCITY: 119.47 CM/S
ECHO MV E DECELERATION TIME (DT): 249.4 MS
ECHO MV E VELOCITY: 82.34 CM/S
ECHO MV E/A RATIO: 0.69
ECHO PULMONARY ARTERY SYSTOLIC PRESSURE (PASP): 30.6 MMHG
ECHO RA MINOR AXIS: 3.41 CM
ECHO TV REGURGITANT MAX VELOCITY: 263 CM/S
ECHO TV REGURGITANT PEAK GRADIENT: 27.6 MMHG

## 2019-09-13 PROCEDURE — 93306 TTE W/DOPPLER COMPLETE: CPT

## 2022-01-03 PROBLEM — M81.0 OSTEOPOROSIS: Status: ACTIVE | Noted: 2022-01-03

## 2022-01-03 RX ORDER — EPINEPHRINE 1 MG/ML
0.3 INJECTION, SOLUTION, CONCENTRATE INTRAVENOUS AS NEEDED
Status: CANCELLED | OUTPATIENT
Start: 2022-01-05

## 2022-01-03 RX ORDER — HYDROCORTISONE SODIUM SUCCINATE 100 MG/2ML
100 INJECTION, POWDER, FOR SOLUTION INTRAMUSCULAR; INTRAVENOUS AS NEEDED
Status: CANCELLED | OUTPATIENT
Start: 2022-01-05

## 2022-01-03 RX ORDER — ALBUTEROL SULFATE 0.83 MG/ML
2.5 SOLUTION RESPIRATORY (INHALATION) AS NEEDED
Status: CANCELLED
Start: 2022-01-05

## 2022-01-03 RX ORDER — DIPHENHYDRAMINE HYDROCHLORIDE 50 MG/ML
50 INJECTION, SOLUTION INTRAMUSCULAR; INTRAVENOUS AS NEEDED
Status: CANCELLED
Start: 2022-01-05

## 2022-01-03 RX ORDER — ONDANSETRON 2 MG/ML
8 INJECTION INTRAMUSCULAR; INTRAVENOUS AS NEEDED
Status: CANCELLED | OUTPATIENT
Start: 2022-01-05

## 2022-01-03 RX ORDER — DIPHENHYDRAMINE HYDROCHLORIDE 50 MG/ML
25 INJECTION, SOLUTION INTRAMUSCULAR; INTRAVENOUS AS NEEDED
Status: CANCELLED
Start: 2022-01-05

## 2022-01-03 RX ORDER — ACETAMINOPHEN 325 MG/1
650 TABLET ORAL AS NEEDED
Status: CANCELLED
Start: 2022-01-05

## 2022-01-05 ENCOUNTER — HOSPITAL ENCOUNTER (OUTPATIENT)
Dept: INFUSION THERAPY | Age: 87
End: 2022-01-05

## 2022-01-05 DIAGNOSIS — M81.0 OSTEOPOROSIS, UNSPECIFIED OSTEOPOROSIS TYPE, UNSPECIFIED PATHOLOGICAL FRACTURE PRESENCE: Primary | ICD-10-CM

## 2022-02-10 ENCOUNTER — HOSPITAL ENCOUNTER (OUTPATIENT)
Dept: INFUSION THERAPY | Age: 87
Discharge: HOME | End: 2022-02-10
Payer: MEDICARE

## 2022-02-10 VITALS
SYSTOLIC BLOOD PRESSURE: 129 MMHG | RESPIRATION RATE: 18 BRPM | OXYGEN SATURATION: 96 % | DIASTOLIC BLOOD PRESSURE: 77 MMHG | HEART RATE: 86 BPM | TEMPERATURE: 97.4 F

## 2022-02-10 DIAGNOSIS — M81.0 OSTEOPOROSIS, UNSPECIFIED OSTEOPOROSIS TYPE, UNSPECIFIED PATHOLOGICAL FRACTURE PRESENCE: Primary | ICD-10-CM

## 2022-02-10 LAB
ANION GAP BLD CALC-SCNC: 10 MMOL/L (ref 10–20)
BUN BLD-MCNC: 18 MG/DL (ref 7–18)
CA-I BLD-MCNC: 1.19 MMOL/L (ref 1.12–1.32)
CHLORIDE BLD-SCNC: 103 MMOL/L (ref 100–108)
CO2 BLD-SCNC: 29 MMOL/L (ref 19–24)
CREAT UR-MCNC: 1.2 MG/DL (ref 0.6–1.3)
GLUCOSE BLD STRIP.AUTO-MCNC: 110 MG/DL (ref 74–106)
PHOSPHATE SERPL-MCNC: 3.7 MG/DL (ref 2.5–4.9)
POTASSIUM BLD-SCNC: 4.4 MMOL/L (ref 3.5–5.5)
SODIUM BLD-SCNC: 141 MMOL/L (ref 136–145)

## 2022-02-10 PROCEDURE — 80047 BASIC METABLC PNL IONIZED CA: CPT

## 2022-02-10 PROCEDURE — 83735 ASSAY OF MAGNESIUM: CPT

## 2022-02-10 PROCEDURE — 74011250636 HC RX REV CODE- 250/636: Performed by: INTERNAL MEDICINE

## 2022-02-10 PROCEDURE — 36415 COLL VENOUS BLD VENIPUNCTURE: CPT

## 2022-02-10 PROCEDURE — 96372 THER/PROPH/DIAG INJ SC/IM: CPT

## 2022-02-10 PROCEDURE — 84100 ASSAY OF PHOSPHORUS: CPT

## 2022-02-10 RX ORDER — ACETAMINOPHEN 325 MG/1
650 TABLET ORAL AS NEEDED
Status: CANCELLED
Start: 2022-08-07

## 2022-02-10 RX ORDER — DIPHENHYDRAMINE HYDROCHLORIDE 50 MG/ML
25 INJECTION, SOLUTION INTRAMUSCULAR; INTRAVENOUS AS NEEDED
Status: CANCELLED
Start: 2022-08-07

## 2022-02-10 RX ORDER — HYDROCORTISONE SODIUM SUCCINATE 100 MG/2ML
100 INJECTION, POWDER, FOR SOLUTION INTRAMUSCULAR; INTRAVENOUS AS NEEDED
Status: CANCELLED | OUTPATIENT
Start: 2022-08-07

## 2022-02-10 RX ORDER — DIPHENHYDRAMINE HYDROCHLORIDE 50 MG/ML
50 INJECTION, SOLUTION INTRAMUSCULAR; INTRAVENOUS AS NEEDED
Status: CANCELLED
Start: 2022-08-07

## 2022-02-10 RX ORDER — ONDANSETRON 2 MG/ML
8 INJECTION INTRAMUSCULAR; INTRAVENOUS AS NEEDED
Status: CANCELLED | OUTPATIENT
Start: 2022-08-07

## 2022-02-10 RX ORDER — EPINEPHRINE 1 MG/ML
0.3 INJECTION, SOLUTION, CONCENTRATE INTRAVENOUS AS NEEDED
Status: CANCELLED | OUTPATIENT
Start: 2022-08-07

## 2022-02-10 RX ORDER — ALBUTEROL SULFATE 0.83 MG/ML
2.5 SOLUTION RESPIRATORY (INHALATION) AS NEEDED
Status: CANCELLED
Start: 2022-08-07

## 2022-02-10 RX ADMIN — DENOSUMAB 60 MG: 60 INJECTION SUBCUTANEOUS at 14:09

## 2022-02-10 NOTE — PROGRESS NOTES
[facility] Progress Note    Date: February 10, 2022    Name: Concha Nicholas    MRN: [id number]         : 1930     Prolia Injection Q 6 months. Ms. Meño Hanson was assessed and education was provided. Patient states she's had Prolia twice at the doctors office and did very well with it. Ms. Yadira Maldonado vitals were reviewed and patient was observed for 5 minutes prior to treatment. Visit Vitals  /77 (BP 1 Location: Left upper arm, BP Patient Position: Sitting)   Pulse 86   Temp 97.4 °F (36.3 °C)   Resp 18   SpO2 96%   Breastfeeding No       Lab results were obtained from her RAC x 1 attempt and reviewed. Recent Results (from the past 12 hour(s))   POC CHEM8    Collection Time: 02/10/22  2:04 PM   Result Value Ref Range    CO2, POC 29 (H) 19 - 24 MMOL/L    Glucose,  (H) 74 - 106 MG/DL    BUN, POC 18 7 - 18 MG/DL    Creatinine, POC 1.2 0.6 - 1.3 MG/DL    GFRAA, POC 51 (L) >60 ml/min/1.73m2    GFRNA, POC 42 (L) >60 ml/min/1.73m2    Sodium,  136 - 145 MMOL/L    Potassium, POC 4.4 3.5 - 5.5 MMOL/L    Calcium, ionized (POC) 1.19 1.12 - 1.32 mmol/L    Chloride,  100 - 108 MMOL/L    Anion gap, POC 10 10 - 20         Prolia 60 mg was administered subcutaneously to the back of her Left arm. Band aid applied to site. Ms. Meño Hanson tolerated well, and had no complaints. Patient armband removed and shredded. Ms. Meño Hanson was discharged from [place] [address] in stable condition at 1410. She is to return on 2022 at 1300 for her next appointment.     Deysi Rubin  February 10, 2022  2:15 PM

## 2022-02-14 LAB — MAGNESIUM SERPL-MCNC: 2.6 MG/DL (ref 1.6–2.6)

## 2022-07-06 ENCOUNTER — APPOINTMENT (OUTPATIENT)
Dept: INFUSION THERAPY | Age: 87
End: 2022-07-06

## 2022-08-09 ENCOUNTER — HOSPITAL ENCOUNTER (OUTPATIENT)
Dept: INFUSION THERAPY | Age: 87
Discharge: HOME | End: 2022-08-09
Payer: MEDICARE

## 2022-08-09 VITALS
SYSTOLIC BLOOD PRESSURE: 149 MMHG | TEMPERATURE: 97.5 F | WEIGHT: 151.3 LBS | OXYGEN SATURATION: 95 % | HEART RATE: 65 BPM | BODY MASS INDEX: 28.59 KG/M2 | RESPIRATION RATE: 18 BRPM | DIASTOLIC BLOOD PRESSURE: 77 MMHG

## 2022-08-09 DIAGNOSIS — M81.0 OSTEOPOROSIS, UNSPECIFIED OSTEOPOROSIS TYPE, UNSPECIFIED PATHOLOGICAL FRACTURE PRESENCE: Primary | ICD-10-CM

## 2022-08-09 LAB
ANION GAP BLD CALC-SCNC: 9 MMOL/L (ref 10–20)
BUN BLD-MCNC: 17 MG/DL (ref 7–18)
CA-I BLD-MCNC: 1.19 MMOL/L (ref 1.12–1.32)
CHLORIDE BLD-SCNC: 108 MMOL/L (ref 100–108)
CO2 BLD-SCNC: 26 MMOL/L (ref 19–24)
CREAT UR-MCNC: 1.1 MG/DL (ref 0.6–1.3)
GLUCOSE BLD STRIP.AUTO-MCNC: 112 MG/DL (ref 74–106)
MAGNESIUM SERPL-MCNC: 2.2 MG/DL (ref 1.6–2.6)
PHOSPHATE SERPL-MCNC: 4.2 MG/DL (ref 2.5–4.9)
POTASSIUM BLD-SCNC: 4.4 MMOL/L (ref 3.5–5.5)
SODIUM BLD-SCNC: 142 MMOL/L (ref 136–145)

## 2022-08-09 PROCEDURE — 36415 COLL VENOUS BLD VENIPUNCTURE: CPT

## 2022-08-09 PROCEDURE — 74011250636 HC RX REV CODE- 250/636: Performed by: INTERNAL MEDICINE

## 2022-08-09 PROCEDURE — 84100 ASSAY OF PHOSPHORUS: CPT

## 2022-08-09 PROCEDURE — 96372 THER/PROPH/DIAG INJ SC/IM: CPT

## 2022-08-09 PROCEDURE — 83735 ASSAY OF MAGNESIUM: CPT

## 2022-08-09 PROCEDURE — 80047 BASIC METABLC PNL IONIZED CA: CPT

## 2022-08-09 RX ORDER — DIPHENHYDRAMINE HYDROCHLORIDE 50 MG/ML
50 INJECTION, SOLUTION INTRAMUSCULAR; INTRAVENOUS AS NEEDED
Start: 2023-02-05

## 2022-08-09 RX ORDER — EPINEPHRINE 1 MG/ML
0.3 INJECTION, SOLUTION, CONCENTRATE INTRAVENOUS AS NEEDED
OUTPATIENT
Start: 2023-02-05

## 2022-08-09 RX ORDER — DIPHENHYDRAMINE HYDROCHLORIDE 50 MG/ML
25 INJECTION, SOLUTION INTRAMUSCULAR; INTRAVENOUS AS NEEDED
Start: 2023-02-05

## 2022-08-09 RX ORDER — HYDROCORTISONE SODIUM SUCCINATE 100 MG/2ML
100 INJECTION, POWDER, FOR SOLUTION INTRAMUSCULAR; INTRAVENOUS AS NEEDED
OUTPATIENT
Start: 2023-02-05

## 2022-08-09 RX ORDER — ALBUTEROL SULFATE 0.83 MG/ML
2.5 SOLUTION RESPIRATORY (INHALATION) AS NEEDED
Start: 2023-02-05

## 2022-08-09 RX ORDER — ACETAMINOPHEN 325 MG/1
650 TABLET ORAL AS NEEDED
Start: 2023-02-05

## 2022-08-09 RX ORDER — ONDANSETRON 2 MG/ML
8 INJECTION INTRAMUSCULAR; INTRAVENOUS AS NEEDED
OUTPATIENT
Start: 2023-02-05

## 2022-08-09 RX ADMIN — DENOSUMAB 60 MG: 60 INJECTION SUBCUTANEOUS at 13:40

## 2022-08-09 NOTE — PROGRESS NOTES
[facility] Progress Note    Date: 2022    Name: Jake Garcia    MRN: [id number]         : 1930     Prolia Injection Q 6 months    Ms. Morfin arrived to [facility], ambulatory at 1320. Ms. Anupam French was assessed and education was provided. Patient states she tolerated last dos well. Ms. William Bridges vitals were reviewed and patient was observed for 5 minutes prior to treatment. Visit Vitals  BP (!) 149/77 (BP 1 Location: Left upper arm, BP Patient Position: Sitting)   Pulse 65   Temp 97.5 °F (36.4 °C)   Resp 18   Wt 68.6 kg (151 lb 4.8 oz)   SpO2 95%   Breastfeeding No   BMI 28.59 kg/m²       Lab results were obtained from her RAC x 1 attempt, by Al phlebotomist, and reviewed. Recent Results (from the past 12 hour(s))   POC CHEM8    Collection Time: 22  1:29 PM   Result Value Ref Range    CO2, POC 26 (H) 19 - 24 MMOL/L    Glucose,  (H) 74 - 106 MG/DL    BUN, POC 17 7 - 18 MG/DL    Creatinine, POC 1.1 0.6 - 1.3 MG/DL    GFRAA, POC 56 (L) >60 ml/min/1.73m2    GFRNA, POC 46 (L) >60 ml/min/1.73m2    Sodium,  136 - 145 MMOL/L    Potassium, POC 4.4 3.5 - 5.5 MMOL/L    Calcium, ionized (POC) 1.19 1.12 - 1.32 mmol/L    Chloride,  100 - 108 MMOL/L    Anion gap, POC 9 (L) 10 - 20       Ionized Calcium: 1.19    Prolia 60 mg was administered SQ to left upper arm. Band aid applied to site. Ms. Anupam French tolerated well, and had no complaints. Patient armband removed and shredded. Ms. Anupam French was discharged from [place] [address] in stable condition at 1340. She is to return on 2/10/2023 at 1330 for her next appointment.     Destin Frederick RN  2022  2:15 PM

## 2023-02-10 ENCOUNTER — APPOINTMENT (OUTPATIENT)
Dept: INFUSION THERAPY | Age: 88
End: 2023-02-10

## (undated) DEVICE — SUTURE MCRYL SZ 4-0 L18IN ABSRB UD L19MM PS-2 3/8 CIR PRIM Y496G

## (undated) DEVICE — ANTI-FOG SOLUTION WITH FOAM PAD: Brand: DEVON

## (undated) DEVICE — STERILE POLYISOPRENE POWDER-FREE SURGICAL GLOVES: Brand: PROTEXIS

## (undated) DEVICE — SUTURE VCRL SZ 2-0 L27IN ABSRB UD L26MM SH 1/2 CIR J417H

## (undated) DEVICE — COLUMN DRAPE

## (undated) DEVICE — ARM DRAPE

## (undated) DEVICE — SOL IRR NACL 0.9% 500ML POUR --

## (undated) DEVICE — DERMABOND SKIN ADH 0.7ML -- DERMABOND ADVANCED 12/BX

## (undated) DEVICE — REM POLYHESIVE ADULT PATIENT RETURN ELECTRODE: Brand: VALLEYLAB

## (undated) DEVICE — SEAL UNIV 5-8MM DISP BX/10 -- DA VINCI XI - SNGL USE

## (undated) DEVICE — SUTURE VLOC 90 2/0 VL 6 GS-22 VLOCM2105

## (undated) DEVICE — KENDALL SCD EXPRESS SLEEVES, KNEE LENGTH, MEDIUM: Brand: KENDALL SCD

## (undated) DEVICE — Device

## (undated) DEVICE — INTENDED FOR TISSUE SEPARATION, AND OTHER PROCEDURES THAT REQUIRE A SHARP SURGICAL BLADE TO PUNCTURE OR CUT.: Brand: BARD-PARKER ® CARBON RIB-BACK BLADES

## (undated) DEVICE — DRAPE,UTILITY,TAPE,15X26,STERILE: Brand: MEDLINE

## (undated) DEVICE — PREP SKN CHLRAPRP 26ML TNT -- CONVERT TO ITEM 373320

## (undated) DEVICE — BLADELESS OPTICAL TROCAR WITH FIXATION CANNULA: Brand: VERSAPORT

## (undated) DEVICE — LIGHT HANDLE: Brand: DEVON

## (undated) DEVICE — BLADELESS OBTURATOR: Brand: WECK VISTA

## (undated) DEVICE — BLADE ASSEMB CLP HAIR FINE --